# Patient Record
Sex: FEMALE | Race: WHITE | URBAN - METROPOLITAN AREA
[De-identification: names, ages, dates, MRNs, and addresses within clinical notes are randomized per-mention and may not be internally consistent; named-entity substitution may affect disease eponyms.]

---

## 2017-08-17 ENCOUNTER — OFFICE VISIT (OUTPATIENT)
Dept: FAMILY MEDICINE | Facility: CLINIC | Age: 37
End: 2017-08-17
Payer: COMMERCIAL

## 2017-08-17 VITALS
SYSTOLIC BLOOD PRESSURE: 119 MMHG | WEIGHT: 134.4 LBS | DIASTOLIC BLOOD PRESSURE: 78 MMHG | TEMPERATURE: 97.9 F | HEART RATE: 80 BPM

## 2017-08-17 DIAGNOSIS — J02.8 VIRAL SORE THROAT: Primary | ICD-10-CM

## 2017-08-17 DIAGNOSIS — R07.0 THROAT PAIN: ICD-10-CM

## 2017-08-17 DIAGNOSIS — B97.89 VIRAL SORE THROAT: Primary | ICD-10-CM

## 2017-08-17 LAB
DEPRECATED S PYO AG THROAT QL EIA: NORMAL
SPECIMEN SOURCE: NORMAL

## 2017-08-17 PROCEDURE — 87081 CULTURE SCREEN ONLY: CPT | Performed by: FAMILY MEDICINE

## 2017-08-17 PROCEDURE — 87880 STREP A ASSAY W/OPTIC: CPT | Performed by: FAMILY MEDICINE

## 2017-08-17 PROCEDURE — 99213 OFFICE O/P EST LOW 20 MIN: CPT | Performed by: FAMILY MEDICINE

## 2017-08-17 RX ORDER — CALCIUM CARBONATE 500(1250)
1 TABLET ORAL 2 TIMES DAILY
COMMUNITY

## 2017-08-17 NOTE — PATIENT INSTRUCTIONS
Thank you for choosing [facility].  You may be receiving a survey in the mail from [facility] regarding your visit today.  Please take a few minutes to complete and return the survey to let us know how we are doing.  Our Clinic hours are:  Mondays    7:20 am - 7 pm  Tues -  Fri  7:20 am - 5 pm  Clinic Phone: [phone]  The clinic lab opens at 7:30 am Mon - Fri and appointments are required.  [facility]. [phone]  Monday-Thursday 8 am - 7pm  Tues/Wed/Fri 8 am - 5:30 pm

## 2017-08-17 NOTE — NURSING NOTE
Chief Complaint   Patient presents with     Pharyngitis     sore throat, head congestion, ear pressure, and diarrhea on and off. Started saturday 8/12/17 when they landed. Here visiting family, lives in [place].        Initial /78 (BP Location: Left arm, Patient Position: Chair, Cuff Size: Adult Regular)  Pulse 80  Temp 97.9  F (36.6  C) (Tympanic)  Wt 134 lb 6.4 oz (61 kg) There is no height or weight on file to calculate BMI.  Medication Reconciliation: complete   Lesly Mark CMA

## 2017-08-17 NOTE — LETTER
August 18, 2017        Kimi Simon  [address] [address]          The results of your 24 hour throat culture were negative. If you have any further questions please contact your clinic.            Sincerely,        Heidi Easton MD

## 2017-08-17 NOTE — MR AVS SNAPSHOT
"              After Visit Summary   8/17/2017    Kimi Simon    MRN: [id number]           Patient Information     Date Of Birth          1980        Visit Information        Provider Department      8/17/2017 8:40 AM Heidi Easton MD [facility]        Today's Diagnoses     Viral sore throat    -  1    Throat pain          Care Instructions      Thank you for choosing [facility].  You may be receiving a survey in the mail [facility] today.  Please take a few minutes to complete and return the survey to let us know how we are doing.  Our Clinic hours are:  Mondays    7:20 am - 7 pm  Tues -  Fri  7:20 am - 5 pm  Clinic Phone: [phone]  The clinic lab opens at 7:30 am Mon - Fri and appointments are required.  [facility]  Ph. [phone]  Monday-Thursday 8 am - 7pm  Tues/Wed/Fri 8 am - 5:30 pm             Follow-ups after your visit        Who to contact     If you have questions or need follow up information about today's clinic visit or your schedule please contact [facility] directly at [phone].  Normal or non-critical lab and imaging results will be communicated to you by MyChart, letter or phone within 4 business days after the clinic has received the results. If you do not hear from us within 7 days, please contact the clinic through MyChart or phone. If you have a critical or abnormal lab result, we will notify you by phone as soon as possible.  Submit refill requests through [email or web address] or call your pharmacy and they will forward the refill request to us. Please allow 3 business days for your refill to be completed.          Additional Information About Your Visit        MyChart Information     [email or web address] lets you send messages to your doctor, view your test results, renew your prescriptions, schedule appointments and more. To sign up, go to [email or web address] . Click on \"Log in\" on the left side of the " "screen, which will take you to the Welcome page. Then click on \"Sign up Now\" on the right side of the page.     You will be asked to enter the access code listed below, as well as some personal information. Please follow the directions to create your username and password.     Your access code is: [email or web address]  Expires: 11/15/2017  9:34 AM     Your access code will  in 90 days. If you need help or a new code, please call your [facility] or [phone].        Care EveryWhere ID     This is your Care EveryWhere ID. This could be used by other organizations to access your [place] medical records  [id number]        Your Vitals Were     Pulse Temperature                80 97.9  F (36.6  C) (Tympanic)           Blood Pressure from Last 3 Encounters:   17 119/78    Weight from Last 3 Encounters:   17 134 lb 6.4 oz (61 kg)              We Performed the Following     Beta strep group A culture     Strep, Rapid Screen        Primary Care Provider Office Phone #    [facility] [phone]       No address on file        Equal Access to Services     EMILY CHEEK : Hadii aad ku hadasho Sosanfordali, waaxda luqadaha, qaybta kaalmada adejose roberto, joshua parker . So [facility] [phone].    ATENCIÓN: Si habla español, tiene a anderson disposición servicios gratuitos de asistencia lingüística. Kevin al [phone].    We comply with applicable federal civil rights laws and [place] laws. We do not discriminate on the basis of race, color, national origin, age, disability sex, sexual orientation or gender identity.            Thank you!     Thank you for choosing [facility]  for your care. Our goal is always to provide you with excellent care. Hearing back from our patients is one way we can continue to improve our services. Please take a few minutes to complete the written survey that you may receive in the mail after your visit with us. Thank you!      "        Your Updated Medication List - Protect others around you: Learn how to safely use, store and throw away your medicines at www.disposemymeds.org.          This list is accurate as of: 8/17/17  9:35 AM.  Always use your most recent med list.                   Brand Name Dispense Instructions for use Diagnosis    calcium carbonate 1250 MG tablet    OS- mg [place]. [place]     Take 1 tablet by mouth 2 times daily        IMAN CORDOVA-CHROMIUM PO           GINKOBA PO           MULTIVITAL PO           VITAMIN E COMPLEX PO

## 2017-08-17 NOTE — PROGRESS NOTES
SUBJECTIVE:                                                    Kimi Simon is a 37 year old female who presents to clinic today for the following health issues:    Chief Complaint   Patient presents with     Pharyngitis     sore throat, head congestion, ear pressure, and diarrhea on and off. Started saturday 8/12/17 when they landed. Here visiting family, lives in [place].      ENT Symptoms           Symptoms: cc Present Absent Comment   Fever/Chills     x Wearing warm clothes at night to make herself sweat   Fatigue  x  Body aches   Muscle Aches  x     Eye Irritation   x    Sneezing   x    Nasal Mario/Drg  x     Sinus Pressure/Pain  x     Loss of smell   x    Dental pain   x    Sore Throat x      Swollen Glands    Tender anterior neck   Ear Pain/Fullness       Cough   x    Wheeze   x    Chest Pain   x    Shortness of breath   x    Rash   x    Other  x  Loss of appetite     Symptom duration:  6 days   Symptom severity:  moderate   Treatments tried:  dayquil   Contacts:  recent travel -plane and has two little kids       OBJECTIVE: /78 (BP Location: Left arm, Patient Position: Chair, Cuff Size: Adult Regular)  Pulse 80  Temp 97.9  F (36.6  C) (Tympanic)  Wt 134 lb 6.4 oz (61 kg)    Afebrile.  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 There is no erythema or drainage.  Nose is clear.  Throat is pink more on the right tonsillar pillar. There is no exudate, no palatal or buccal lesions.  There is no cervical adenopathy.  The lungs are clear without wheezes, rales, or rhonchi.  Heart sounds are regular without murmur.  The abdomen is benign.    Results for orders placed or performed in visit on 08/17/17   Strep, Rapid Screen   Result Value Ref Range    Specimen Description Throat     Rapid Strep A Screen       NEGATIVE: No Group A streptococcal antigen detected by immunoassay, await culture report.   Beta strep group A culture   Result Value Ref Range    Specimen  Description Throat     Culture Micro No beta hemolytic Streptococcus Group A isolated      ASSESSMENT: viral sore throat    PLAN: OK for salt gargles, Chloraseptic, Ibuprofen    Heidi Easton md

## 2017-08-18 LAB
BACTERIA SPEC CULT: NORMAL
SPECIMEN SOURCE: NORMAL